# Patient Record
Sex: MALE | Race: WHITE | NOT HISPANIC OR LATINO
[De-identification: names, ages, dates, MRNs, and addresses within clinical notes are randomized per-mention and may not be internally consistent; named-entity substitution may affect disease eponyms.]

---

## 2022-12-25 ENCOUNTER — TRANSCRIPTION ENCOUNTER (OUTPATIENT)
Age: 62
End: 2022-12-25

## 2022-12-28 PROBLEM — Z00.00 ENCOUNTER FOR PREVENTIVE HEALTH EXAMINATION: Status: ACTIVE | Noted: 2022-12-28

## 2023-01-06 ENCOUNTER — APPOINTMENT (OUTPATIENT)
Dept: NEUROLOGY | Facility: CLINIC | Age: 63
End: 2023-01-06
Payer: COMMERCIAL

## 2023-01-06 DIAGNOSIS — Z78.9 OTHER SPECIFIED HEALTH STATUS: ICD-10-CM

## 2023-01-06 DIAGNOSIS — Z86.79 PERSONAL HISTORY OF OTHER DISEASES OF THE CIRCULATORY SYSTEM: ICD-10-CM

## 2023-01-06 DIAGNOSIS — R40.4 TRANSIENT ALTERATION OF AWARENESS: ICD-10-CM

## 2023-01-06 DIAGNOSIS — Z82.49 FAMILY HISTORY OF ISCHEMIC HEART DISEASE AND OTHER DISEASES OF THE CIRCULATORY SYSTEM: ICD-10-CM

## 2023-01-06 DIAGNOSIS — Z85.828 PERSONAL HISTORY OF OTHER MALIGNANT NEOPLASM OF SKIN: ICD-10-CM

## 2023-01-06 DIAGNOSIS — Z86.39 PERSONAL HISTORY OF OTHER ENDOCRINE, NUTRITIONAL AND METABOLIC DISEASE: ICD-10-CM

## 2023-01-06 DIAGNOSIS — R29.90 UNSPECIFIED SYMPTOMS AND SIGNS INVOLVING THE NERVOUS SYSTEM: ICD-10-CM

## 2023-01-06 DIAGNOSIS — R19.5 OTHER FECAL ABNORMALITIES: ICD-10-CM

## 2023-01-06 DIAGNOSIS — Z84.1 FAMILY HISTORY OF DISORDERS OF KIDNEY AND URETER: ICD-10-CM

## 2023-01-06 DIAGNOSIS — I10 ESSENTIAL (PRIMARY) HYPERTENSION: ICD-10-CM

## 2023-01-06 DIAGNOSIS — E78.5 HYPERLIPIDEMIA, UNSPECIFIED: ICD-10-CM

## 2023-01-06 PROCEDURE — 99204 OFFICE O/P NEW MOD 45 MIN: CPT | Mod: 95

## 2023-01-06 NOTE — ASSESSMENT
[FreeTextEntry1] : Routine and 24 hour ambulatory EEG \par BP < 130 /80\par continue aspirin and statin\par follow up with cardiology, rule out tachyarrhythmia\par Follow up possible aneurysm with Dr. Colon\par Will set up follow-up (telehealth okay) after EEG \par

## 2023-01-06 NOTE — DISCUSSION/SUMMARY
[FreeTextEntry1] : Dann is a 62-year-old left-handed gentleman with a past medical history of hypertension and hyperlipidemia presenting with an episode of acute confusion/amnesia in the setting of exercising. Was hypertensive upon arrival. Episode lasted ~ 4 hours. Would have expected restriction diffusion if this was a vascular event. \par \par Event most consistent with transient global amnesia. Physical activity can bring it on and patients who are triggered by physical exertion are more likely to have it reoccur. \par Unclear why it happens but it is more likely in people with vascular risk factors. Therefore, recommend to continue aspirin, continue statin, LDL is good and BP < 130/80. He will follow-up with cardiology - he is on metoprolol for tachyarrhythmia but has sinus bradycardia with first degree AV block. Will follow-up with Dr. Colon for possible aneurysm. Recommended that if this episode was to reoccur, he should still go to the emergency room and rule out stroke. \par \par Recommend ambulatory EEG to rule out focal impaired aware seizure.

## 2023-01-06 NOTE — HISTORY OF PRESENT ILLNESS
[Home] : at home, [unfilled] , at the time of the visit. 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 He is presenting to clinic for hospital follow-up. He was hospitalized on 12/24/22 after an episode of amnesia, likely TGA. \par \par He reports that he was on his exercise bike on 12/24. His daughter noticed that he was repeating himself. No focal weakness. He has no memory of being on his bike. She called an ambulance. He has no memory of this. He only remembers being in the scanner in the ED, and this memory is vague. Upon arrival, he was disoriented. He did not know the month or the  year. \par EKG was sinus bradycardia with a right bundle branch block. LDL was 73. Blood pressure was 167/112, heart rate was 67. MRI brain showed mild age-appropriate involutional and ischemic gliotic changes. No abnormal enhancement. CT perfusion was normal. CTA brain neck unremarkable apart from ? of small 4 mm saccular aneurysm of right MCA bifurcation. Has a small 3 mm prominence of the right posterior communicating artery, likely representing an infundibulum. Saccular aneurysm less likely. \par \par COVID PCR was positive (asymptomatic - infected in November). \par \par He reports that he has had 3 or 4 episodes like this in the past. All after exercise. They would last 10-15 minutes with 1-2 episodes in the past year. 1st episode was 5-10 years ago. He never had complete amnesia before. With this episode, he did not have a headache afterwards. No prolactin drawn. \par \par On evaluation by neurology the following day, he was back to baseline. He recently followed up with cardiologist Dr. Simmons and had an echocardiogram (results pending). He had a holter monitor (results pending). He is on metoprolol for episodes of reflex tachycardia. \par \par He takes daily aspirin 81 and a statin. No known history of atrial fibrillation. He will be following up with Dr. Colon for ? of right MCA bifurcation aneurysm. Discussed with him that if this is an aneurysm, bp control is important, goal < 130/80 and he should avoid excess alcohol. He is a non-smoker. Not a diabetic. \par \par \par \par \par \par

## 2023-01-06 NOTE — DATA REVIEWED
[de-identified] : 12/24/22: MRI brain: Mild age-appropriate involutional and ischemic gliotic changes. No acute infarcts, hemorrhage or mass. \par 12/24/22: CTA brain/neck: no proximal large vessel arterial occlusion, flow limiting stenosis or dissection within brain or neck\par Possible small saccular aneurysm at the right MCA bifuration. No evidence of rupture. \par CT perfusion: no acute core ischemic infarct or critically hypoperfused tissue

## 2023-01-09 ENCOUNTER — APPOINTMENT (OUTPATIENT)
Dept: NEUROSURGERY | Facility: CLINIC | Age: 63
End: 2023-01-09
Payer: COMMERCIAL

## 2023-01-09 VITALS
OXYGEN SATURATION: 98 % | HEART RATE: 65 BPM | BODY MASS INDEX: 37.13 KG/M2 | WEIGHT: 245 LBS | HEIGHT: 68 IN | SYSTOLIC BLOOD PRESSURE: 118 MMHG | DIASTOLIC BLOOD PRESSURE: 70 MMHG

## 2023-01-09 DIAGNOSIS — I67.1 CEREBRAL ANEURYSM, NONRUPTURED: ICD-10-CM

## 2023-01-09 PROCEDURE — 99205 OFFICE O/P NEW HI 60 MIN: CPT

## 2023-01-09 NOTE — DATA REVIEWED
[de-identified] : CTA HEAD HEAD/NECK/PERFUSION: 12/24/22: CT PERFUSION:\par Examination is slightly degraded due to motion. Within this limitation,\par perfusion parameters are reported as follows:\par CBF < 30%: 0 mL\par TMax > 6s: 0 mL\par Mismatch volume: 0 mL\par Mismatch ratio: None\par \par No CBF less than 30% to suggest acute core ischemic infarct. No areas of Tmax\par greater than 6 seconds to suggest critically hypoperfused tissue at risk.\par \par \par IMPRESSION:\par \par CTA brain neck:\par 1. No proximal large vessel arterial occlusion, flow-limiting stenosis or\par dissection within the brain and neck.\par 2. Possible small (4 mm) saccular aneurysm at the right MCA bifurcation. No\par evidence for rupture. Consider follow-up imaging as clinically indicated.\par \par CT perfusion:\par 3. No acute core ischemic infarct or critically hypoperfused tissue at risk is\par identified. If there is continued clinical suspicion for evolving acute\par cerebral ischemia, consider further evaluation with MRI.\par  [de-identified] : MRI BRAIN WITH AND WITHOUT CONTRAST: 12/24/22: IMPRESSION-IMPRESSION: Mild age-appropriate involutional and ischemic gliotic changes. No\par acute infarcts, hemorrhage or mass. No abnormal enhancement.

## 2023-01-09 NOTE — HISTORY OF PRESENT ILLNESS
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 Per family, he had at least three similar episodes prior to this event, but this was the most significant. Stroke work up was negative. CTA revealed findings suspicious for a right middle cerebral artery aneurysm. Found to be COVID positive, but asymptomatic. Observed overnight with return of short term memory. Cardiac workup was to be completed as an outpatient with patient's cardiologist, Dr. Ely. Patient was discharged on 12/25/22. \par \par Patient has since undergone cardiology work up with Dr. Faustino Flood with normal echocardiogram by report. He is currently wearing a Halter monitor.  Denies other neurological symptoms.

## 2023-01-10 PROBLEM — I67.1 CEREBRAL ANEURYSM: Status: ACTIVE | Noted: 2023-01-10

## 2023-07-05 ENCOUNTER — RESULT REVIEW (OUTPATIENT)
Age: 63
End: 2023-07-05

## 2023-08-11 ENCOUNTER — APPOINTMENT (OUTPATIENT)
Dept: NEUROSURGERY | Facility: CLINIC | Age: 63
End: 2023-08-11
Payer: COMMERCIAL

## 2023-08-11 PROCEDURE — 99215 OFFICE O/P EST HI 40 MIN: CPT | Mod: 95

## 2023-08-11 NOTE — HISTORY OF PRESENT ILLNESS
[FreeTextEntry1] : Mr. Sahu has agreed to two-way audiovisual telehealth consultation. He is located at his home [address] [place] [address]; [facility] and I am located at my office at [facility].  Mr. Sahu presents in neurosurgical follow up. Previous notes and interval history reviewed. Denies neurological symptoms. Surveillance MRA detailed below.

## 2023-08-11 NOTE — DATA REVIEWED
[de-identified] : MRA BRAIN: 7/6/23: IMPRESSION- No hemodynamically significant stenosis or intracranial aneurysm.

## 2023-08-11 NOTE — ASSESSMENT
[FreeTextEntry1] : Mr. Sahu presents with MRA 7/6/23 which demonstrates no overt evidence for an aneurysm at the middle cerebral artery bifurcation aneurysm when compared to CTA 12/24/23.   Natural history discussed at length with the patient. After a lengthy conversation, the patient would like to proceed with surveillance imaging, which I think is feasible. Therefore, we will obtain a MRA brain in 6 months (per patient request) with an appointment to follow (can be Telehealth or in person).  I have asked the patient and his wife to contact me for any symptomatic development or progression in the interim at which time we can obtain expedited follow up imaging.  A total of 45 minutes were spent relative to this encounter.

## 2023-08-29 ENCOUNTER — APPOINTMENT (OUTPATIENT)
Dept: NEUROSURGERY | Facility: CLINIC | Age: 63
End: 2023-08-29